# Patient Record
Sex: MALE | Race: WHITE | NOT HISPANIC OR LATINO | Employment: FULL TIME | URBAN - METROPOLITAN AREA
[De-identification: names, ages, dates, MRNs, and addresses within clinical notes are randomized per-mention and may not be internally consistent; named-entity substitution may affect disease eponyms.]

---
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
## 2022-01-08 ENCOUNTER — OFFICE VISIT (OUTPATIENT)
Dept: URGENT CARE | Facility: CLINIC | Age: 22
End: 2022-01-08
Payer: MEDICAID

## 2022-01-08 VITALS
BODY MASS INDEX: 22.67 KG/M2 | WEIGHT: 192 LBS | DIASTOLIC BLOOD PRESSURE: 60 MMHG | SYSTOLIC BLOOD PRESSURE: 110 MMHG | RESPIRATION RATE: 18 BRPM | OXYGEN SATURATION: 99 % | TEMPERATURE: 98 F | HEIGHT: 77 IN | HEART RATE: 72 BPM

## 2022-01-08 DIAGNOSIS — U07.1 COVID-19: Primary | ICD-10-CM

## 2022-01-08 LAB
CTP QC/QA: YES
SARS-COV-2 AG RESP QL IA.RAPID: POSITIVE

## 2022-01-08 PROCEDURE — 1160F PR REVIEW ALL MEDS BY PRESCRIBER/CLIN PHARMACIST DOCUMENTED: ICD-10-PCS | Mod: CPTII,S$GLB,, | Performed by: NURSE PRACTITIONER

## 2022-01-08 PROCEDURE — 99203 PR OFFICE/OUTPT VISIT, NEW, LEVL III, 30-44 MIN: ICD-10-PCS | Mod: S$GLB,,, | Performed by: NURSE PRACTITIONER

## 2022-01-08 PROCEDURE — 3008F BODY MASS INDEX DOCD: CPT | Mod: CPTII,S$GLB,, | Performed by: NURSE PRACTITIONER

## 2022-01-08 PROCEDURE — 3078F PR MOST RECENT DIASTOLIC BLOOD PRESSURE < 80 MM HG: ICD-10-PCS | Mod: CPTII,S$GLB,, | Performed by: NURSE PRACTITIONER

## 2022-01-08 PROCEDURE — 1159F MED LIST DOCD IN RCRD: CPT | Mod: CPTII,S$GLB,, | Performed by: NURSE PRACTITIONER

## 2022-01-08 PROCEDURE — 3074F PR MOST RECENT SYSTOLIC BLOOD PRESSURE < 130 MM HG: ICD-10-PCS | Mod: CPTII,S$GLB,, | Performed by: NURSE PRACTITIONER

## 2022-01-08 PROCEDURE — 1160F RVW MEDS BY RX/DR IN RCRD: CPT | Mod: CPTII,S$GLB,, | Performed by: NURSE PRACTITIONER

## 2022-01-08 PROCEDURE — 87811 SARS CORONAVIRUS 2 ANTIGEN POCT, MANUAL READ: ICD-10-PCS | Mod: S$GLB,,, | Performed by: NURSE PRACTITIONER

## 2022-01-08 PROCEDURE — 3078F DIAST BP <80 MM HG: CPT | Mod: CPTII,S$GLB,, | Performed by: NURSE PRACTITIONER

## 2022-01-08 PROCEDURE — 1159F PR MEDICATION LIST DOCUMENTED IN MEDICAL RECORD: ICD-10-PCS | Mod: CPTII,S$GLB,, | Performed by: NURSE PRACTITIONER

## 2022-01-08 PROCEDURE — 87811 SARS-COV-2 COVID19 W/OPTIC: CPT | Mod: S$GLB,,, | Performed by: NURSE PRACTITIONER

## 2022-01-08 PROCEDURE — 3008F PR BODY MASS INDEX (BMI) DOCUMENTED: ICD-10-PCS | Mod: CPTII,S$GLB,, | Performed by: NURSE PRACTITIONER

## 2022-01-08 PROCEDURE — 99203 OFFICE O/P NEW LOW 30 MIN: CPT | Mod: S$GLB,,, | Performed by: NURSE PRACTITIONER

## 2022-01-08 PROCEDURE — 3074F SYST BP LT 130 MM HG: CPT | Mod: CPTII,S$GLB,, | Performed by: NURSE PRACTITIONER

## 2022-01-08 RX ORDER — ALBUTEROL SULFATE 90 UG/1
2 AEROSOL, METERED RESPIRATORY (INHALATION) EVERY 6 HOURS PRN
Qty: 18 G | Refills: 0 | Status: SHIPPED | OUTPATIENT
Start: 2022-01-08 | End: 2023-01-08

## 2022-01-08 RX ORDER — PROMETHAZINE HYDROCHLORIDE AND DEXTROMETHORPHAN HYDROBROMIDE 6.25; 15 MG/5ML; MG/5ML
5 SYRUP ORAL 3 TIMES DAILY PRN
Qty: 240 ML | Refills: 0 | Status: SHIPPED | OUTPATIENT
Start: 2022-01-08 | End: 2022-01-18

## 2022-01-08 RX ORDER — FLUTICASONE PROPIONATE 50 MCG
1 SPRAY, SUSPENSION (ML) NASAL DAILY
Qty: 16 G | Refills: 0 | Status: SHIPPED | OUTPATIENT
Start: 2022-01-08

## 2022-01-08 NOTE — PATIENT INSTRUCTIONS
Vit C 2000 mg daily, Vit D 1000 iu daily, Zinc 50 mg daily  ED for Fever > 102 not resolving with medication, significant shortness of breath or chest pain, Oxygen level less than 93%, or other worsening symptoms.   Instructions for Patients with Confirmed or Suspected COVID-19    If you are awaiting your test result, you will either be called or it will be released to the patient portal.  If you have any questions about your test, please visit [email or web address] or call our COVID-19 information line at [phone].      Please isolate yourself at home.  You may leave home and/or return to work once the following conditions are met:    If you have symptoms and tested positive:   More than 5 days since symptoms first appeared AND   More than 24 hours fever free without medications AND       symptoms have improved   · For five days after ending isolation, masks are required.    If you had no symptoms but tested positive:   More than 5 days since the date of the first positive test. If you develop symptoms, then use the guidelines above  · For five days after ending isolation, masks are required.      Testing is not recommended if you are symptom free after completing isolation.

## 2022-01-08 NOTE — LETTER
[address]  [place] [place] [facility] [place] [address]  Phone: [phone]          Return to Work/School    Patient: Michael Manzano  YOB: 2000   Date: 01/08/2022     To Whom It May Concern:     Michael Manzano was in contact with/seen in my office on 01/08/2022. COVID-19 is present in our communities across the state. There is limited testing for COVID at this time, so not all patients can be tested. In this situation, your employee meets the following criteria:     Michael Manznao has met the criteria for COVID-19 testing and has a POSITIVE result. He can return to work once they are asymptomatic for 24 hours without the use of fever reducing medications AND at least five days from the start of symptoms (or from the first positive result if they have no symptoms).      If you have any questions or concerns, or if I can be of further assistance, please do not hesitate to contact me.     Sincerely,    Hanh Wilkins NP

## 2022-01-08 NOTE — PROGRESS NOTES
"Subjective:       Patient ID: Michael Manzano is a 21 y.o. male.    Vitals:  height is 6' 5" (1.956 m) and weight is 87.1 kg (192 lb). His oral temperature is 97.8 °F (36.6 °C). His blood pressure is 110/60 and his pulse is 72. His respiration is 18 and oxygen saturation is 99%.     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     History reviewed. No pertinent past medical history.    History reviewed. No pertinent surgical history.    History reviewed.  No pertinent family history.      Social History    Socioeconomic History      Marital status: Single    Tobacco Use      Smoking status: Never Smoker    Substance and Sexual Activity      Alcohol use: No      Current Outpatient Medications:  albuterol (VENTOLIN HFA) 90 mcg/actuation inhaler, Inhale 2 puffs into the lungs every 6 (six) hours as needed for Wheezing. Rescue, Disp: 18 g, Rfl: 0  cephALEXin (KEFLEX) 500 MG capsule, Take 1 capsule (500 mg total) by mouth every 8 (eight) hours. (Patient not taking: Reported on 1/8/2022), Disp: 20 capsule, Rfl: 0  fluticasone propionate (FLONASE) 50 mcg/actuation nasal spray, 1 spray (50 mcg total) by Each Nostril route once daily., Disp: 16 g, Rfl: 0  hydrocodone-acetaminophen 5-325mg (NORCO) 5-325 mg per tablet, Take 1 tablet by mouth 4 (four) times daily as needed. (Patient not taking: Reported on 1/8/2022), Disp: 8 tablet, Rfl: 0  promethazine-dextromethorphan (PROMETHAZINE-DM) 6.25-15 mg/5 mL Syrp, Take 5 mLs by mouth 3 (three) times daily as needed., Disp: 240 mL, Rfl: 0    No current facility-administered medications for this visit.      Review of patient's allergies indicates:  No Known Allergies    Fever   This is a new problem. The current episode started yesterday. The maximum temperature noted was 101 to 101.9 F. Associated symptoms include congestion, coughing, headaches and nausea. Pertinent negatives include no wheezing. Associated " symptoms comments: Dizziness, chills, sweats  . He has tried acetaminophen for the symptoms. The treatment provided mild relief.       Constitution: Positive for fever.   HENT: Positive for congestion and postnasal drip.    Respiratory: Positive for cough. Negative for shortness of breath and wheezing.    Gastrointestinal: Positive for nausea.   Neurological: Positive for headaches.       Objective:      Physical Exam   Constitutional: He is oriented to person, place, and time.  Non-toxic appearance. No distress.   HENT:   Head: Normocephalic and atraumatic.   Cardiovascular: Normal rate.   Pulmonary/Chest: Effort normal and breath sounds normal. No respiratory distress.   Abdominal: Normal appearance and bowel sounds are normal. There is no abdominal tenderness.   Neurological: no focal deficit. He is alert and oriented to person, place, and time.   Psychiatric: His behavior is normal. Mood normal.         Assessment:       1. COVID-19          Plan:     Rapid COVID 19 test positive, results dicussed with patient, questions answered. Symptomatic treatment as discussed, Self Quarantine guidelines as discussed, and ED precuaitons reviewed. Patient states understanding. COVID 19 patient education handout given.    COVID-19  -     SARS Coronavirus 2 Antigen, POCT Manual Read  -     albuterol (VENTOLIN HFA) 90 mcg/actuation inhaler; Inhale 2 puffs into the lungs every 6 (six) hours as needed for Wheezing. Rescue  Dispense: 18 g; Refill: 0  -     promethazine-dextromethorphan (PROMETHAZINE-DM) 6.25-15 mg/5 mL Syrp; Take 5 mLs by mouth 3 (three) times daily as needed.  Dispense: 240 mL; Refill: 0  -     fluticasone propionate (FLONASE) 50 mcg/actuation nasal spray; 1 spray (50 mcg total) by Each Nostril route once daily.  Dispense: 16 g; Refill: 0

## 2025-02-26 ENCOUNTER — HOSPITAL ENCOUNTER (EMERGENCY)
Facility: HOSPITAL | Age: 25
Discharge: HOME | End: 2025-02-26
Attending: STUDENT IN AN ORGANIZED HEALTH CARE EDUCATION/TRAINING PROGRAM
Payer: MEDICAID

## 2025-02-26 VITALS
BODY MASS INDEX: 20.07 KG/M2 | RESPIRATION RATE: 16 BRPM | DIASTOLIC BLOOD PRESSURE: 70 MMHG | HEIGHT: 77 IN | HEART RATE: 60 BPM | WEIGHT: 170 LBS | SYSTOLIC BLOOD PRESSURE: 120 MMHG | TEMPERATURE: 99 F | OXYGEN SATURATION: 99 %

## 2025-02-26 DIAGNOSIS — R07.89 CHEST WALL PAIN: ICD-10-CM

## 2025-02-26 DIAGNOSIS — R07.9 CHEST PAIN: ICD-10-CM

## 2025-02-26 PROCEDURE — 93005 ELECTROCARDIOGRAM TRACING: CPT | Performed by: INTERNAL MEDICINE

## 2025-02-26 PROCEDURE — 99284 EMERGENCY DEPT VISIT MOD MDM: CPT | Mod: 25

## 2025-02-26 PROCEDURE — 63600175 PHARM REV CODE 636 W HCPCS: Mod: TB,JZ | Performed by: STUDENT IN AN ORGANIZED HEALTH CARE EDUCATION/TRAINING PROGRAM

## 2025-02-26 PROCEDURE — 96372 THER/PROPH/DIAG INJ SC/IM: CPT | Performed by: STUDENT IN AN ORGANIZED HEALTH CARE EDUCATION/TRAINING PROGRAM

## 2025-02-26 PROCEDURE — 25000003 PHARM REV CODE 250: Performed by: STUDENT IN AN ORGANIZED HEALTH CARE EDUCATION/TRAINING PROGRAM

## 2025-02-26 PROCEDURE — 93010 ELECTROCARDIOGRAM REPORT: CPT | Mod: ,,, | Performed by: INTERNAL MEDICINE

## 2025-02-26 RX ORDER — METHOCARBAMOL 750 MG/1
750 TABLET, FILM COATED ORAL
Status: COMPLETED | OUTPATIENT
Start: 2025-02-26 | End: 2025-02-26

## 2025-02-26 RX ORDER — KETOROLAC TROMETHAMINE 30 MG/ML
30 INJECTION, SOLUTION INTRAMUSCULAR; INTRAVENOUS
Status: COMPLETED | OUTPATIENT
Start: 2025-02-26 | End: 2025-02-26

## 2025-02-26 RX ORDER — METHOCARBAMOL 750 MG/1
750 TABLET, FILM COATED ORAL 3 TIMES DAILY
Qty: 15 TABLET | Refills: 0 | Status: SHIPPED | OUTPATIENT
Start: 2025-02-26 | End: 2025-03-03

## 2025-02-26 RX ADMIN — KETOROLAC TROMETHAMINE 30 MG: 30 INJECTION, SOLUTION INTRAMUSCULAR at 08:02

## 2025-02-26 RX ADMIN — METHOCARBAMOL 750 MG: 750 TABLET ORAL at 08:02

## 2025-02-26 NOTE — Clinical Note
"Michael Manzano (Justin) was seen and treated in our emergency department on 2/26/2025.  He may return with limitations on 02/27/2025.       Sincerely,           "

## 2025-02-26 NOTE — Clinical Note
"Michael"Tunde Manzano was seen and treated in our emergency department on 2/26/2025.  He may return with limitations on 02/27/2025.  Light duty for one day, then he may return to full duty.     Sincerely,      Ziyad Garcia NRP     "

## 2025-02-26 NOTE — Clinical Note
"Michael"Tunde Manzano was seen and treated in our emergency department on 2/26/2025.  He may return with limitations on 02/27/2025.  Light duty for tomorrow only, then full duty afterwards.      Sincerely,      Ziyad Garcia NRP     "

## 2025-02-27 LAB
OHS QRS DURATION: 108 MS
OHS QTC CALCULATION: 427 MS

## 2025-02-27 NOTE — DISCHARGE INSTRUCTIONS
Please return to the emergency department if you have new or worsening symptoms.  Follow up with the primary care doctor in 3-5 days.  He has been referred to [facility] for further evaluation.

## 2025-02-27 NOTE — FIRST PROVIDER EVALUATION
Emergency Department TeleTriage Encounter Note      CHIEF COMPLAINT    Chief Complaint   Patient presents with    Shortness of Breath    Chest Pain     Patient c/o pain with respiration       VITAL SIGNS   Initial Vitals   BP Pulse Resp Temp SpO2   02/26/25 1933 02/26/25 1933 02/26/25 1932 02/26/25 1933 02/26/25 1933   124/72 62 18 98.8 °F (37.1 °C) 99 %      MAP       --                   ALLERGIES    Review of patient's allergies indicates:  No Known Allergies    PROVIDER TRIAGE NOTE  Patient presents with chest wall pain and shortness of breath. Pain worse with movement of the arm on inspiration. No recent URI or trauma. He reports pain in the upper back and radiating down right arm. He denies injury, but pain started after he picked up a ladder      ORDERS  Labs Reviewed - No data to display    ED Orders (720h ago, onward)      Start Ordered     Status Ordering Provider    02/26/25 1935 02/26/25 1935  EKG 12-lead  Once         Completed by ALEXANDER OQUENDO on 2/26/2025 at  7:36 PM BERHANE BARRAGAN    02/26/25 1934 02/26/25 1933  X-Ray Chest PA And Lateral  1 time imaging         Ordered BERHANE BARRAGAN              Virtual Visit Note: The provider triage portion of this emergency department evaluation and documentation was performed via "[employer] a HIPAA-compliant telemedicine application, in concert with a tele-presenter in the room. A face to face patient evaluation with one of my colleagues will occur once the patient is placed in an emergency department room.      DISCLAIMER: This note was prepared with [employer] voice recognition transcription software. Garbled syntax, mangled pronouns, and other bizarre constructions may be attributed to that software system.

## 2025-02-27 NOTE — ED PROVIDER NOTES
Encounter Date: 2/26/2025       History     Chief Complaint   Patient presents with    Shortness of Breath    Chest Pain     Patient c/o pain with respiration     HPI    Michael Manzano is a 25 y.o. male without significant medical problems that presents emergency department for evaluation of right-sided chest wall pain that started earlier today.  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  He has a half day today and then went home.  After lifting up a ladder, he started to experience right-sided chest wall pain.  Nonexertional.  Pain worse with deep inspiration.  Pain is radiating down his right arm and back.  Initially was constant but now is only during deep breaths.  Pain is worse with twisting and using the right arm.  Endorse nausea during the initial episode, but this has resolved.  Denies numbness, weakness, abdominal pain, vomiting, headache, dizziness, changes in bowel habits, changes in urinary habits, coughing, and rashes.    Review of patient's allergies indicates:  No Known Allergies  No past medical history on file.  No past surgical history on file.  No family history on file.  Social History[1]  Review of Systems   Constitutional:  Negative for fever.   HENT:  Negative for sore throat.    Eyes:  Negative for visual disturbance.   Respiratory:  Positive for shortness of breath. Negative for cough.    Cardiovascular:  Positive for chest pain.   Gastrointestinal:  Positive for nausea. Negative for abdominal pain, constipation, diarrhea and vomiting.   Genitourinary:  Negative for dysuria, frequency and hematuria.   Musculoskeletal:  Positive for back pain. Negative for neck pain.   Skin:  Negative for rash.   Neurological:  Negative for dizziness, weakness, numbness and headaches.   Hematological:  Does not bruise/bleed easily.       Physical Exam     Initial Vitals   BP Pulse Resp Temp SpO2   02/26/25 1933 02/26/25 1933 02/26/25 1932 02/26/25 1933 02/26/25 1933   124/72 62 18 98.8 °F (37.1 °C) 99 %       MAP       --                Physical Exam    Nursing note and vitals reviewed.  Constitutional: He appears well-developed and well-nourished.   HENT:   Head: Normocephalic and atraumatic.   Eyes: EOM are normal. Pupils are equal, round, and reactive to light.   Neck:   Normal range of motion.  Cardiovascular:  Normal rate, regular rhythm, normal heart sounds and intact distal pulses.           No murmur heard.  Pulmonary/Chest: Breath sounds normal. No respiratory distress. He has no wheezes. He has no rhonchi. He has no rales. He exhibits no tenderness.   Abdominal: Abdomen is soft. He exhibits no distension.   Musculoskeletal:         General: Normal range of motion.      Cervical back: Normal range of motion.      Comments: Pain is elicited with arm abduction against resistance.  Pain is also elicited with pushing and pulling motions of the right arm.  No overlying skin changes.  No evidence of trauma.  Minimal chest wall tenderness.     Neurological: He is alert and oriented to person, place, and time. He has normal strength. No cranial nerve deficit or sensory deficit. GCS score is 15. GCS eye subscore is 4. GCS verbal subscore is 5. GCS motor subscore is 6.   Skin: Capillary refill takes less than 2 seconds.   Psychiatric: He has a normal mood and affect.         ED Course   Procedures  Labs Reviewed - No data to display  EKG Readings: (Independently Interpreted)   Rhythm: Normal Sinus Rhythm. Ectopy: No Ectopy. Conduction: Normal. ST Segments: Normal ST Segments. T Waves: Normal. Clinical Impression: Normal Sinus Rhythm   Rightward axis deviation but otherwise sinus rhythm without acute ST segment or T-wave changes.  Minor sinus arrhythmia.  No prior EKG to compare to.       Imaging Results              X-Ray Chest PA And Lateral (In process)                   X-Rays:   Independently Interpreted Readings:   Other Readings:  No acute cardiopulmonary abnormality.    Medications   ketorolac injection 30 mg (30  mg Intramuscular Given 2/26/25 2020)   methocarbamoL tablet 750 mg (750 mg Oral Given 2/26/25 2019)     Medical Decision Making  Michael Manzano is a 25 y.o. male without significant medical problems that presents emergency department for evaluation of right-sided chest wall pain that started earlier today in the setting of lifting a ladder.  Vitals were stable.  Nontoxic male.  Pain is easily elicited with pushing and pulling of the right upper extremity.  Abduction of the right shoulder elicits pain.  Full range of motion and no pain with passive range of motion.  Neurovascularly intact in the right upper extremity.  Presentation most consistent with chest wall pain secondary to muscle strain.  EKG shows normal sinus rhythm without acute ST segment or T-wave changes.  There is slight right axis deviation with sinus arrhythmia.  No previous EKGs to compare to.  Chest x-ray on my independent interpretation shows no acute cardiopulmonary abnormality.  Patient was given Toradol and Robaxin and now symptomatically improved.  Stable for discharge.  Return precautions given.  Given short prescription for Robaxin.  Referred to [facility] for primary care.    Risk  Prescription drug management.                                      Clinical Impression:  Final diagnoses:  [R07.9] Chest pain  [R07.89] Chest wall pain          ED Disposition Condition    Discharge Stable          ED Prescriptions       Medication Sig Dispense Start Date End Date Auth. Provider    methocarbamoL (ROBAXIN) 750 MG Tab Take 1 tablet (750 mg total) by mouth 3 (three) times daily. for 5 days 15 tablet 2/26/2025 3/3/2025 Carlos Saavedra MD          Follow-up Information       Follow up With Specialties Details Why Contact Info Additional Information    [facility] - Emergency Dept Emergency Medicine  As needed, If symptoms worsen [address] [facility] [address] [phone] 1st floor    [facility]  - [facility]  Call in 1 day Follow-up on ED visit [address] [place] [address]  [phone]                    [1]   Social History  Tobacco Use    Smoking status: Never   Substance Use Topics    Alcohol use: Carlos Bang MD  02/26/25 4522